# Patient Record
Sex: MALE | Race: WHITE | HISPANIC OR LATINO | URBAN - METROPOLITAN AREA
[De-identification: names, ages, dates, MRNs, and addresses within clinical notes are randomized per-mention and may not be internally consistent; named-entity substitution may affect disease eponyms.]

---

## 2023-06-08 ENCOUNTER — OFFICE VISIT (OUTPATIENT)
Dept: FAMILY MEDICINE CLINIC | Facility: CLINIC | Age: 53
End: 2023-06-08
Payer: COMMERCIAL

## 2023-06-08 VITALS
HEART RATE: 76 BPM | TEMPERATURE: 98 F | SYSTOLIC BLOOD PRESSURE: 118 MMHG | BODY MASS INDEX: 36.96 KG/M2 | HEIGHT: 74 IN | WEIGHT: 288 LBS | RESPIRATION RATE: 20 BRPM | DIASTOLIC BLOOD PRESSURE: 76 MMHG

## 2023-06-08 DIAGNOSIS — R07.9 CHEST PAIN, UNSPECIFIED TYPE: ICD-10-CM

## 2023-06-08 DIAGNOSIS — R94.31 ST ELEVATION: ICD-10-CM

## 2023-06-08 DIAGNOSIS — E78.5 HYPERLIPIDEMIA, UNSPECIFIED HYPERLIPIDEMIA TYPE: ICD-10-CM

## 2023-06-08 DIAGNOSIS — I10 PRIMARY HYPERTENSION: Primary | ICD-10-CM

## 2023-06-08 PROBLEM — Z95.5 HISTORY OF HEART ARTERY STENT: Status: ACTIVE | Noted: 2023-06-08

## 2023-06-08 PROBLEM — E78.2 MIXED HYPERLIPIDEMIA: Status: RESOLVED | Noted: 2023-06-08 | Resolved: 2023-06-08

## 2023-06-08 PROBLEM — E78.2 MIXED HYPERLIPIDEMIA: Status: ACTIVE | Noted: 2023-06-08

## 2023-06-08 PROBLEM — I25.2 HISTORY OF ACUTE MYOCARDIAL INFARCTION: Status: ACTIVE | Noted: 2023-06-08

## 2023-06-08 PROCEDURE — 93000 ELECTROCARDIOGRAM COMPLETE: CPT

## 2023-06-08 PROCEDURE — 99204 OFFICE O/P NEW MOD 45 MIN: CPT

## 2023-06-08 RX ORDER — LISINOPRIL 2.5 MG/1
2.5 TABLET ORAL DAILY
Qty: 90 TABLET | Refills: 0 | Status: SHIPPED | OUTPATIENT
Start: 2023-06-08

## 2023-06-08 RX ORDER — ATORVASTATIN CALCIUM 40 MG/1
40 TABLET, FILM COATED ORAL DAILY
Qty: 90 TABLET | Refills: 0 | Status: SHIPPED | OUTPATIENT
Start: 2023-06-08

## 2023-06-08 RX ORDER — NITROGLYCERIN 0.4 MG/1
0.4 TABLET SUBLINGUAL
COMMUNITY
End: 2023-06-08 | Stop reason: SDUPTHER

## 2023-06-08 RX ORDER — ASPIRIN 81 MG/1
81 TABLET ORAL DAILY
COMMUNITY

## 2023-06-08 RX ORDER — ATORVASTATIN CALCIUM 40 MG/1
40 TABLET, FILM COATED ORAL DAILY
COMMUNITY
End: 2023-06-08 | Stop reason: SDUPTHER

## 2023-06-08 RX ORDER — NITROGLYCERIN 0.4 MG/1
0.4 TABLET SUBLINGUAL
Qty: 6 TABLET | Refills: 0 | Status: SHIPPED | OUTPATIENT
Start: 2023-06-08

## 2023-06-08 RX ORDER — LISINOPRIL 2.5 MG/1
2.5 TABLET ORAL DAILY
COMMUNITY
End: 2023-06-08 | Stop reason: SDUPTHER

## 2023-06-08 NOTE — PATIENT INSTRUCTIONS
Send medical records over or drop off at office  Referral to cardiology  To ER with any chest pain at rest, shortness of breath or diaphoresis.

## 2023-06-08 NOTE — PROGRESS NOTES
"Chief Complaint   Patient presents with    [facility]    soreness     Upper body          Subjective      Huang Daly is a 53 y.o. who presents to [facility] and discuss upper body soreness.  Lives in [place] with his wife and 3 children (22, 17, 15). 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 Previous patient of Dr. Muñoz.     Last month had lab work with Dr. Ruiz's office.     Cardiac history -  had syncope episode. Treated with Dr. Christina.   had cardiac stent.   had repeat heart cath and another stent.  Dr. Christina released him to follow up as needed. He had no problems or concerns until the last year.  In the last year he has noticed intermittent chest pain with exertion.  No shortness of air, diaphoresis with the chest pain.  Pain does radiate to jaw and left arm at times.  He has not used his nitro.  Nitro is .    The following portions of the patient's history were reviewed and updated as appropriate: allergies, current medications, past family history, past medical history, past social history, past surgical history, and problem list.    Review of Systems   Constitutional:  Negative for chills and fever.   Eyes: Negative.    Respiratory:  Negative for chest tightness, shortness of breath and wheezing.    Cardiovascular:  Positive for chest pain (with exertion / exercise). Negative for palpitations and leg swelling.   Gastrointestinal: Negative.    Neurological:  Negative for dizziness, syncope, speech difficulty, light-headedness, numbness and headache.     Objective   Vital Signs:  /76   Pulse 76   Temp 98 °F (36.7 °C)   Resp 20   Ht 188 cm (74\")   Wt 131 kg (288 lb)   BMI 36.98 kg/m²             Physical Exam  Vitals and nursing note reviewed.   Constitutional:       Appearance: Normal appearance.   HENT:      Head: Normocephalic.   Cardiovascular:      Rate and Rhythm: Normal rate and regular rhythm.      Heart sounds: Normal heart sounds.   Pulmonary:     "  Breath sounds: Normal breath sounds.   Skin:     General: Skin is warm and dry.   Neurological:      General: No focal deficit present.      Mental Status: He is alert and oriented to person, place, and time.   Psychiatric:         Mood and Affect: Mood normal.         Behavior: Behavior normal.        Result Review              ECG 12 Lead    Date/Time: 6/8/2023 2:27 PM  Performed by: Mariah Worthy APRN  Authorized by: Mariah Worthy APRN   Comparison: not compared with previous ECG   Previous ECG: no previous ECG available  Rhythm: sinus rhythm  Rate: normal  ST Elevation: aVR, V3, V2 and V1  T flattening: II, III, aVF, V5 and V6    Clinical impression: abnormal EKG            Assessment and Plan  Diagnoses and all orders for this visit:    1. Primary hypertension (Primary)  -     lisinopril (PRINIVIL,ZESTRIL) 2.5 MG tablet; Take 1 tablet by mouth Daily.  Dispense: 90 tablet; Refill: 0    2. Hyperlipidemia, unspecified hyperlipidemia type  -     atorvastatin (LIPITOR) 40 MG tablet; Take 1 tablet by mouth Daily.  Dispense: 90 tablet; Refill: 0    3. Chest pain, unspecified type  -     ECG 12 Lead  -     nitroglycerin (NITROSTAT) 0.4 MG SL tablet; Place 1 tablet under the tongue Every 5 (Five) Minutes As Needed for Chest Pain. Take no more than 3 doses in 15 minutes.  Dispense: 6 tablet; Refill: 0  -     Ambulatory Referral to Cardiology    4. ST elevation  -     Ambulatory Referral to Cardiology      Consulted Dr. Lieberman, cardiology.  Will refill nitro.  Patient counseled to go to ER with any chest pain at rest, shortness of breath or diaphoresis.  Referral to cardiology for further workup.     Counseled patient for 10 minutes regarding EKG findings, concerning symptoms and the importance of seeing cardiology and going to ER with new or worsening symptoms.  Patient ultimately chose to see Dr. Lieberman.  Patient is going to see if he can obtain labs/records from recent lab work and return to clinic.           Patient Instructions   Send medical records over or drop off at office  Referral to cardiology  To ER with any chest pain at rest, shortness of breath or diaphoresis.     Discussed medications prescribed and OTC medications recommended.  Discussed medication safety, possible side effects and how to take or administer medications. Instructed patient to report any adverse reactions, side effects or concerns.     Reviewed physical exam findings and plan with patient who verbalized understanding and agrees with plan of care. Patient was given opportunity to ask questions and all concerns were addressed prior to the conclusion of today's visit.       Follow Up  No follow-ups on file.  Patient was given instructions and counseling regarding his condition or for health maintenance advice. Please see specific information pulled into the AVS if appropriate.

## 2023-11-30 DIAGNOSIS — E78.5 HYPERLIPIDEMIA, UNSPECIFIED HYPERLIPIDEMIA TYPE: ICD-10-CM

## 2023-11-30 DIAGNOSIS — I10 PRIMARY HYPERTENSION: ICD-10-CM

## 2023-11-30 RX ORDER — ATORVASTATIN CALCIUM 40 MG/1
40 TABLET, FILM COATED ORAL DAILY
Qty: 90 TABLET | Refills: 0 | OUTPATIENT
Start: 2023-11-30

## 2023-11-30 RX ORDER — LISINOPRIL 2.5 MG/1
2.5 TABLET ORAL DAILY
Qty: 90 TABLET | Refills: 0 | OUTPATIENT
Start: 2023-11-30

## 2023-11-30 NOTE — TELEPHONE ENCOUNTER
Caller: Huang Daly    Relationship: Self    Best call back number     Requested Prescriptions:   Requested Prescriptions     Pending Prescriptions Disp Refills    atorvastatin (LIPITOR) 40 MG tablet 90 tablet 0     Sig: Take 1 tablet by mouth Daily.    lisinopril (PRINIVIL,ZESTRIL) 2.5 MG tablet 90 tablet 0     Sig: Take 1 tablet by mouth Daily.        Pharmacy where request should be sent: [facility] [id number] [address] [phone] [facility] [phone]      Last office visit with prescribing clinician: 6/8/2023   Last telemedicine visit with prescribing clinician: Visit date not found   Next office visit with prescribing clinician: Visit date not found     Additional details provided by patient: PATIENT IS OUT OF MEDICATION AND REQUESTING 90 DAYS     Does the patient have less than a 3 day supply:  [x] Yes  [] No    Would you like a call back once the refill request has been completed: [x] Yes [] No    If the office needs to give you a call back, can they leave a voicemail: [] Yes [x] No PREFERS TEXT MESSAGE    Dora Johnson Rep   11/30/23 13:54 EST

## 2023-12-01 RX ORDER — LISINOPRIL 2.5 MG/1
2.5 TABLET ORAL DAILY
Qty: 30 TABLET | Refills: 0 | Status: SHIPPED | OUTPATIENT
Start: 2023-12-01 | End: 2023-12-06 | Stop reason: SDUPTHER

## 2023-12-01 RX ORDER — ATORVASTATIN CALCIUM 40 MG/1
40 TABLET, FILM COATED ORAL DAILY
Qty: 30 TABLET | Refills: 0 | Status: SHIPPED | OUTPATIENT
Start: 2023-12-01 | End: 2023-12-06 | Stop reason: SDUPTHER

## 2023-12-01 NOTE — TELEPHONE ENCOUNTER
PATIENT CALLED BACK. HE WANTS TO MAKE SURE THESE ARE UPDATE TO A 90 DAY SUPPLY. PLEASE RESEND WITH NEW QUANTITY.    HE WOULD ALSO LIKE REFILLS AS WELL.

## 2023-12-05 ENCOUNTER — TELEPHONE (OUTPATIENT)
Dept: FAMILY MEDICINE CLINIC | Facility: CLINIC | Age: 53
End: 2023-12-05
Payer: COMMERCIAL

## 2023-12-05 NOTE — TELEPHONE ENCOUNTER
Sonal accidentally scheduled for 6 months from now when he called back. Attempted to call to schedule now- no answer and no voicemail.

## 2023-12-05 NOTE — TELEPHONE ENCOUNTER
Spoke with patient. He stated that he only got a 30 day supply and needed a 90 supply for LIPITOR and PRINIVIL, ZESTRIL.     room air

## 2023-12-06 ENCOUNTER — OFFICE VISIT (OUTPATIENT)
Dept: FAMILY MEDICINE CLINIC | Facility: CLINIC | Age: 53
End: 2023-12-06
Payer: COMMERCIAL

## 2023-12-06 VITALS
HEIGHT: 74 IN | OXYGEN SATURATION: 98 % | BODY MASS INDEX: 37.47 KG/M2 | TEMPERATURE: 97.3 F | SYSTOLIC BLOOD PRESSURE: 115 MMHG | RESPIRATION RATE: 18 BRPM | HEART RATE: 81 BPM | WEIGHT: 292 LBS | DIASTOLIC BLOOD PRESSURE: 72 MMHG

## 2023-12-06 DIAGNOSIS — I10 PRIMARY HYPERTENSION: ICD-10-CM

## 2023-12-06 DIAGNOSIS — R07.9 CHEST PAIN, UNSPECIFIED TYPE: ICD-10-CM

## 2023-12-06 DIAGNOSIS — Z12.5 PROSTATE CANCER SCREENING: ICD-10-CM

## 2023-12-06 DIAGNOSIS — E78.5 HYPERLIPIDEMIA, UNSPECIFIED HYPERLIPIDEMIA TYPE: Primary | ICD-10-CM

## 2023-12-06 DIAGNOSIS — R94.31 ST ELEVATION: ICD-10-CM

## 2023-12-06 LAB
ALBUMIN SERPL-MCNC: 4.7 G/DL (ref 3.5–5.2)
ALBUMIN/GLOB SERPL: 2 G/DL
ALP SERPL-CCNC: 102 U/L (ref 39–117)
ALT SERPL-CCNC: 43 U/L (ref 1–41)
AST SERPL-CCNC: 22 U/L (ref 1–40)
BILIRUB SERPL-MCNC: 0.7 MG/DL (ref 0–1.2)
BUN SERPL-MCNC: 19 MG/DL (ref 6–20)
BUN/CREAT SERPL: 15 (ref 7–25)
CALCIUM SERPL-MCNC: 10.3 MG/DL (ref 8.6–10.5)
CHLORIDE SERPL-SCNC: 105 MMOL/L (ref 98–107)
CHOLEST SERPL-MCNC: 177 MG/DL (ref 0–200)
CO2 SERPL-SCNC: 25.6 MMOL/L (ref 22–29)
CREAT SERPL-MCNC: 1.27 MG/DL (ref 0.76–1.27)
EGFRCR SERPLBLD CKD-EPI 2021: 67.6 ML/MIN/1.73
ERYTHROCYTE [DISTWIDTH] IN BLOOD BY AUTOMATED COUNT: 12.4 % (ref 12.3–15.4)
GLOBULIN SER CALC-MCNC: 2.3 GM/DL
GLUCOSE SERPL-MCNC: 91 MG/DL (ref 65–99)
HCT VFR BLD AUTO: 45.7 % (ref 37.5–51)
HDLC SERPL-MCNC: 62 MG/DL (ref 40–60)
HGB BLD-MCNC: 15.1 G/DL (ref 13–17.7)
LDLC SERPL CALC-MCNC: 103 MG/DL (ref 0–100)
MCH RBC QN AUTO: 30.2 PG (ref 26.6–33)
MCHC RBC AUTO-ENTMCNC: 33 G/DL (ref 31.5–35.7)
MCV RBC AUTO: 91.4 FL (ref 79–97)
PLATELET # BLD AUTO: 297 10*3/MM3 (ref 140–450)
POTASSIUM SERPL-SCNC: 5.2 MMOL/L (ref 3.5–5.2)
PROT SERPL-MCNC: 7 G/DL (ref 6–8.5)
PSA SERPL-MCNC: 0.63 NG/ML (ref 0–4)
RBC # BLD AUTO: 5 10*6/MM3 (ref 4.14–5.8)
SODIUM SERPL-SCNC: 142 MMOL/L (ref 136–145)
TRIGL SERPL-MCNC: 65 MG/DL (ref 0–150)
VLDLC SERPL CALC-MCNC: 12 MG/DL (ref 5–40)
WBC # BLD AUTO: 7.01 10*3/MM3 (ref 3.4–10.8)

## 2023-12-06 PROCEDURE — 99214 OFFICE O/P EST MOD 30 MIN: CPT

## 2023-12-06 RX ORDER — ATORVASTATIN CALCIUM 40 MG/1
40 TABLET, FILM COATED ORAL DAILY
Qty: 90 TABLET | Refills: 1 | Status: SHIPPED | OUTPATIENT
Start: 2023-12-06

## 2023-12-06 RX ORDER — LISINOPRIL 2.5 MG/1
2.5 TABLET ORAL DAILY
Qty: 90 TABLET | Refills: 1 | Status: SHIPPED | OUTPATIENT
Start: 2023-12-06

## 2023-12-06 NOTE — PROGRESS NOTES
"Chief Complaint   Patient presents with    6 month f/u     Hypertension, med refills- 90 day supply  Questions on flu shot       Subjective      Huang Daly is a 53 y.o. who presents for follow up on hypertension and chest pain.  He brings labs today from Dec of 2022 that will be scanned into chart.  He did not go to cardiology appointment due to insurance issue.  He is unsure if he will be able to go.   Has some intermittent chest pain and tightness that seems to be associated with stress.   States he had cologuard 6-7 months ago. Dr. Ruiz's office.     The following portions of the patient's history were reviewed and updated as appropriate: allergies, current medications, past family history, past medical history, past social history, past surgical history, and problem list.    Review of Systems   Constitutional:  Negative for chills and fever.   Respiratory:  Positive for chest tightness (with stress). Negative for shortness of breath.    Cardiovascular:  Positive for chest pain (has not needed nitro. States chest pain has improved from previous, states only seems to be associated with stress now). Negative for palpitations and leg swelling.   Neurological:  Negative for dizziness.   Psychiatric/Behavioral:  Positive for stress.        Objective   Vital Signs:  /72   Pulse 81   Temp 97.3 °F (36.3 °C)   Resp 18   Ht 188 cm (74\")   Wt 132 kg (292 lb)   SpO2 98%   BMI 37.49 kg/m²             Physical Exam  Vitals and nursing note reviewed.   Constitutional:       Appearance: Normal appearance.   Neck:      Vascular: No carotid bruit.   Cardiovascular:      Rate and Rhythm: Normal rate and regular rhythm.      Heart sounds: Normal heart sounds.   Pulmonary:      Breath sounds: Normal breath sounds.   Musculoskeletal:      Right lower leg: No edema.      Left lower leg: No edema.   Neurological:      General: No focal deficit present.      Mental Status: He is alert and oriented to person, place, and " time.   Psychiatric:         Behavior: Behavior normal.          Result Review                     Assessment and Plan  Diagnoses and all orders for this visit:    1. Hyperlipidemia, unspecified hyperlipidemia type (Primary)  -     Lipid Panel  -     atorvastatin (LIPITOR) 40 MG tablet; Take 1 tablet by mouth Daily.  Dispense: 90 tablet; Refill: 1    2. Primary hypertension  -     CBC (No Diff)  -     Comprehensive Metabolic Panel  -     lisinopril (PRINIVIL,ZESTRIL) 2.5 MG tablet; Take 1 tablet by mouth Daily.  Dispense: 90 tablet; Refill: 1  -     Ambulatory Referral to Cardiology    3. Chest pain, unspecified type  -     Ambulatory Referral to Cardiology    4. ST elevation  -     Ambulatory Referral to Cardiology    5. Prostate cancer screening  -     PSA Screen      Urged patient to keep cardiology appointment this time, but he is hesitant due to insurance.  We discussed the risks of not keeping the appointment including cardiovascular event and or death. He voices understanding.   Labs today, will call with results.   Patient states coluard 6-7 months ago - requested Dr. Ruiz's records to obtain results.    Follow up in 6 months for recheck.          Discussed medications prescribed and OTC medications recommended.  Discussed medication safety, possible side effects and how to take or administer medications. Instructed patient to report any adverse reactions, side effects or concerns.     Reviewed physical exam findings and plan with patient who verbalized understanding and agrees with plan of care. Patient was given opportunity to ask questions and all concerns were addressed prior to the conclusion of today's visit.     Follow Up  Return in about 6 months (around 6/6/2024) for Recheck.  Patient was given instructions and counseling regarding his condition or for health maintenance advice. Please see specific information pulled into the AVS if appropriate.     Note to patient: The 21st Century Cures Act makes  medical notes like these available to patients in the interest of transparency. However, be advised this is a medical document. It is intended as peer to peer communication. It is written in medical language and may contain abbreviations or verbiage that are unfamiliar. It may appear blunt or direct. Medical documents are intended to carry relevant information, facts as evident, and the clinical opinion of the provider.